# Patient Record
Sex: FEMALE | Race: WHITE | Employment: UNEMPLOYED | URBAN - METROPOLITAN AREA
[De-identification: names, ages, dates, MRNs, and addresses within clinical notes are randomized per-mention and may not be internally consistent; named-entity substitution may affect disease eponyms.]

---

## 2019-03-21 ENCOUNTER — OFFICE VISIT (OUTPATIENT)
Dept: PEDIATRICS | Facility: CLINIC | Age: 39
End: 2019-03-21
Payer: COMMERCIAL

## 2019-03-21 VITALS
SYSTOLIC BLOOD PRESSURE: 106 MMHG | HEIGHT: 68 IN | HEART RATE: 100 BPM | WEIGHT: 150.7 LBS | OXYGEN SATURATION: 99 % | DIASTOLIC BLOOD PRESSURE: 66 MMHG | BODY MASS INDEX: 22.84 KG/M2 | TEMPERATURE: 97.8 F

## 2019-03-21 DIAGNOSIS — M19.079 ARTHRITIS OF ANKLE: Primary | ICD-10-CM

## 2019-03-21 LAB
BASOPHILS # BLD AUTO: 0 10E9/L (ref 0–0.2)
BASOPHILS NFR BLD AUTO: 0.5 %
CRP SERPL-MCNC: 61 MG/L (ref 0–8)
DIFFERENTIAL METHOD BLD: ABNORMAL
EOSINOPHIL # BLD AUTO: 0.1 10E9/L (ref 0–0.7)
EOSINOPHIL NFR BLD AUTO: 0.8 %
ERYTHROCYTE [DISTWIDTH] IN BLOOD BY AUTOMATED COUNT: 12.4 % (ref 10–15)
ERYTHROCYTE [SEDIMENTATION RATE] IN BLOOD BY WESTERGREN METHOD: 45 MM/H (ref 0–20)
HCT VFR BLD AUTO: 35 % (ref 35–47)
HGB BLD-MCNC: 11.4 G/DL (ref 11.7–15.7)
LYMPHOCYTES # BLD AUTO: 1 10E9/L (ref 0.8–5.3)
LYMPHOCYTES NFR BLD AUTO: 12.1 %
MCH RBC QN AUTO: 28.3 PG (ref 26.5–33)
MCHC RBC AUTO-ENTMCNC: 32.6 G/DL (ref 31.5–36.5)
MCV RBC AUTO: 87 FL (ref 78–100)
MONOCYTES # BLD AUTO: 0.6 10E9/L (ref 0–1.3)
MONOCYTES NFR BLD AUTO: 7.6 %
NEUTROPHILS # BLD AUTO: 6.5 10E9/L (ref 1.6–8.3)
NEUTROPHILS NFR BLD AUTO: 79 %
PLATELET # BLD AUTO: 320 10E9/L (ref 150–450)
RBC # BLD AUTO: 4.03 10E12/L (ref 3.8–5.2)
WBC # BLD AUTO: 8.2 10E9/L (ref 4–11)

## 2019-03-21 PROCEDURE — 86140 C-REACTIVE PROTEIN: CPT | Performed by: FAMILY MEDICINE

## 2019-03-21 PROCEDURE — 85652 RBC SED RATE AUTOMATED: CPT | Performed by: FAMILY MEDICINE

## 2019-03-21 PROCEDURE — 86038 ANTINUCLEAR ANTIBODIES: CPT | Performed by: FAMILY MEDICINE

## 2019-03-21 PROCEDURE — 85025 COMPLETE CBC W/AUTO DIFF WBC: CPT | Performed by: FAMILY MEDICINE

## 2019-03-21 PROCEDURE — 99213 OFFICE O/P EST LOW 20 MIN: CPT | Performed by: FAMILY MEDICINE

## 2019-03-21 PROCEDURE — 36415 COLL VENOUS BLD VENIPUNCTURE: CPT | Performed by: FAMILY MEDICINE

## 2019-03-21 PROCEDURE — 86431 RHEUMATOID FACTOR QUANT: CPT | Performed by: FAMILY MEDICINE

## 2019-03-21 RX ORDER — NORGESTIMATE AND ETHINYL ESTRADIOL 0.25-0.035
1 KIT ORAL DAILY
COMMUNITY
End: 2019-06-04

## 2019-03-21 RX ORDER — PREDNISONE 10 MG/1
TABLET ORAL
Qty: 18 TABLET | Refills: 1 | Status: SHIPPED | OUTPATIENT
Start: 2019-03-21 | End: 2019-04-01

## 2019-03-21 RX ORDER — FUROSEMIDE 20 MG
TABLET ORAL
Qty: 15 TABLET | Refills: 1 | Status: SHIPPED | OUTPATIENT
Start: 2019-03-21 | End: 2019-06-04

## 2019-03-21 ASSESSMENT — MIFFLIN-ST. JEOR: SCORE: 1409.13

## 2019-03-21 NOTE — PROGRESS NOTES
"CHIEF COMPLAINT    Ankle pain, swelling following infections      HISTORY    Antonette has developed bilateral ankle pain and swelling onset March 9, SX seem to be worsening. Tend to be worse in AM. Location ankles and Achilles. Slight red / purple discoloration in the area noted. SX not helped by Ibuprofen.    She had Influenza A Feb 10-20. Then DX with pneumonia Feb 26 treated with MICHAEL David.     No past h/o joint problems. Healthy.      There is no problem list on file for this patient.    Med - BCP.      REVIEW OF SYSTEMS    No fever  No SOB  No CP  No nausea  No rash      No past medical history on file.      EXAM  /66 (BP Location: Right arm, Patient Position: Chair, Cuff Size: Adult Regular)   Pulse 100   Temp 97.8  F (36.6  C) (Oral)   Ht 1.715 m (5' 7.5\")   Wt 68.4 kg (150 lb 11.2 oz)   SpO2 99%   BMI 23.25 kg/m      Well appearing woman  Head, Neck unremarkable  Chest non labored breathing  Abd non distended  Hands, wrists - no sweling or tenderness  Ankles: swelling moderate, warmth R > L, Achilles tenderness, tenderness with dorsiflexion.      (M19.079) Arthritis of ankle  (primary encounter diagnosis)  Comment:   Bilateral. Undetermined cause.  Consider post infection.  Plan: CBC with platelets and differential, ESR:         Erythrocyte sedimentation rate, CRP,         inflammation, Anti Nuclear Darby IgG by IFA with         Reflex, Rheumatoid factor, RHEUMATOLOGY         REFERRAL, predniSONE (DELTASONE) 10 MG tablet,         furosemide (LASIX) 20 MG tablet                  "

## 2019-03-22 LAB
ANA SER QL IF: NEGATIVE
RHEUMATOID FACT SER NEPH-ACNC: <20 IU/ML (ref 0–20)

## 2019-04-01 DIAGNOSIS — M19.079 ARTHRITIS OF ANKLE: ICD-10-CM

## 2019-04-01 RX ORDER — PREDNISONE 10 MG/1
TABLET ORAL
Qty: 18 TABLET | Refills: 1 | Status: SHIPPED | OUTPATIENT
Start: 2019-04-01 | End: 2019-06-04

## 2019-06-04 ENCOUNTER — OFFICE VISIT (OUTPATIENT)
Dept: PEDIATRICS | Facility: CLINIC | Age: 39
End: 2019-06-04
Payer: COMMERCIAL

## 2019-06-04 VITALS
BODY MASS INDEX: 23.57 KG/M2 | DIASTOLIC BLOOD PRESSURE: 64 MMHG | SYSTOLIC BLOOD PRESSURE: 106 MMHG | HEIGHT: 68 IN | TEMPERATURE: 98.8 F | HEART RATE: 80 BPM | OXYGEN SATURATION: 100 % | WEIGHT: 155.5 LBS

## 2019-06-04 DIAGNOSIS — Z01.419 WELL WOMAN EXAM WITH ROUTINE GYNECOLOGICAL EXAM: ICD-10-CM

## 2019-06-04 DIAGNOSIS — Z30.41 ENCOUNTER FOR SURVEILLANCE OF CONTRACEPTIVE PILLS: Primary | ICD-10-CM

## 2019-06-04 DIAGNOSIS — F32.0 MILD MAJOR DEPRESSION (H): ICD-10-CM

## 2019-06-04 PROCEDURE — 99385 PREV VISIT NEW AGE 18-39: CPT | Performed by: INTERNAL MEDICINE

## 2019-06-04 PROCEDURE — 87624 HPV HI-RISK TYP POOLED RSLT: CPT | Performed by: INTERNAL MEDICINE

## 2019-06-04 PROCEDURE — G0145 SCR C/V CYTO,THINLAYER,RESCR: HCPCS | Performed by: INTERNAL MEDICINE

## 2019-06-04 RX ORDER — BUPROPION HYDROCHLORIDE 150 MG/1
150 TABLET ORAL EVERY MORNING
COMMUNITY
End: 2019-06-04

## 2019-06-04 RX ORDER — BUPROPION HYDROCHLORIDE 150 MG/1
150 TABLET ORAL EVERY MORNING
Qty: 90 TABLET | Refills: 3 | Status: SHIPPED | OUTPATIENT
Start: 2019-06-04

## 2019-06-04 RX ORDER — NORGESTIMATE AND ETHINYL ESTRADIOL 0.25-0.035
1 KIT ORAL DAILY
Qty: 84 TABLET | Refills: 11 | Status: SHIPPED | OUTPATIENT
Start: 2019-06-04

## 2019-06-04 SDOH — HEALTH STABILITY: MENTAL HEALTH: HOW MANY STANDARD DRINKS CONTAINING ALCOHOL DO YOU HAVE ON A TYPICAL DAY?: 1 OR 2

## 2019-06-04 ASSESSMENT — ENCOUNTER SYMPTOMS
BREAST MASS: 0
SORE THROAT: 0
FEVER: 0
PALPITATIONS: 0
SHORTNESS OF BREATH: 0
EYE PAIN: 0
CONSTIPATION: 0
FREQUENCY: 0
HEMATURIA: 0
MYALGIAS: 0
HEARTBURN: 0
DYSURIA: 0
COUGH: 0
ARTHRALGIAS: 0
NERVOUS/ANXIOUS: 0
HEMATOCHEZIA: 0
PARESTHESIAS: 0
ABDOMINAL PAIN: 0
DIZZINESS: 0
WEAKNESS: 0
NAUSEA: 0
CHILLS: 0
JOINT SWELLING: 0
DIARRHEA: 0
HEADACHES: 0

## 2019-06-04 ASSESSMENT — MIFFLIN-ST. JEOR: SCORE: 1438.84

## 2019-06-04 NOTE — PROGRESS NOTES
SUBJECTIVE:   CC: Antonette Ramos is an 38 year old woman who presents for preventive health visit.     Healthy Habits:     Getting at least 3 servings of Calcium per day:  Yes    Bi-annual eye exam:  Yes    Dental care twice a year:  Yes    Sleep apnea or symptoms of sleep apnea:  None    Diet:  Regular (no restrictions)    Frequency of exercise:  4-5 days/week    Duration of exercise:  15-30 minutes    Taking medications regularly:  Yes    Medication side effects:  None    PHQ-2 Total Score: 0    Additional concerns today:  No    Establishing care.    On Wellbutrin for mild depression symptoms; initially started as post-partum depression and when she tried going off this was more cranky with her children so went back on this.    Currently on OCP for birth control.  does not want to get a vasectomy, she is considering tubal ligation.      Today's PHQ-2 Score:   PHQ-2 ( 1999 [employer]) 6/4/2019   Q1: Little interest or pleasure in doing things 0   Q2: Feeling down, depressed or hopeless 0   PHQ-2 Score 0   Q1: Little interest or pleasure in doing things Not at all   Q2: Feeling down, depressed or hopeless Not at all   PHQ-2 Score 0       Abuse: Current or Past(Physical, Sexual or Emotional)- No  Do you feel safe in your environment? Yes    Social History     Tobacco Use     Smoking status: Never Smoker     Smokeless tobacco: Never Used   Substance Use Topics     Alcohol use: Not on file         Alcohol Use 6/4/2019   Prescreen: >3 drinks/day or >7 drinks/week? No       Reviewed orders with patient.  Reviewed health maintenance and updated orders accordingly - Yes  There is no problem list on file for this patient.    Past Surgical History:   Procedure Laterality Date     ARTHROSCOPY KNEE WITH MENISCECTOMY Right        Social History     Tobacco Use     Smoking status: Never Smoker     Smokeless tobacco: Never Used   Substance Use Topics     Alcohol use: Yes     Drinks per session: 1 or 2     History  "reviewed. No pertinent family history.        Mammo discussed, not appropriate for or declined by this patient.    Pertinent mammograms are reviewed under the imaging tab.  History of abnormal Pap smear: NO - age 30-65 PAP every 5 years with negative HPV co-testing recommended     Reviewed and updated as needed this visit by clinical staff  Tobacco  Allergies  Meds  Med Hx  Surg Hx  Fam Hx  Soc Hx        Reviewed and updated as needed this visit by Provider  Tobacco  Med Hx  Surg Hx  Fam Hx  Soc Hx           Review of Systems   Constitutional: Negative for chills and fever.   HENT: Negative for congestion, ear pain, hearing loss and sore throat.    Eyes: Negative for pain and visual disturbance.   Respiratory: Negative for cough and shortness of breath.    Cardiovascular: Negative for chest pain, palpitations and peripheral edema.   Gastrointestinal: Negative for abdominal pain, constipation, diarrhea, heartburn, hematochezia and nausea.   Breasts:  Negative for tenderness, breast mass and discharge.   Genitourinary: Negative for dysuria, frequency, genital sores, hematuria, pelvic pain, urgency, vaginal bleeding and vaginal discharge.   Musculoskeletal: Negative for arthralgias, joint swelling and myalgias.   Skin: Negative for rash.   Neurological: Negative for dizziness, weakness, headaches and paresthesias.   Psychiatric/Behavioral: Negative for mood changes. The patient is not nervous/anxious.         OBJECTIVE:   /64 (BP Location: Right arm, Patient Position: Sitting, Cuff Size: Adult Regular)   Pulse 80   Temp 98.8  F (37.1  C) (Oral)   Ht 1.727 m (5' 8\")   Wt 70.5 kg (155 lb 8 oz)   SpO2 100%   BMI 23.64 kg/m    Physical Exam  GENERAL: healthy, alert and no distress  EYES: Eyes grossly normal to inspection, PERRL and conjunctivae and sclerae normal  HENT: ear canals and TM's normal, nose and mouth without ulcers or lesions  NECK: no adenopathy, no asymmetry, masses, or scars and thyroid " "normal to palpation  RESP: lungs clear to auscultation - no rales, rhonchi or wheezes  BREAST: normal without masses, tenderness or nipple discharge and no palpable axillary masses or adenopathy  CV: regular rate and rhythm, normal S1 S2, no S3 or S4, no murmur, click or rub, no peripheral edema and peripheral pulses strong  ABDOMEN: soft, nontender, no hepatosplenomegaly, no masses and bowel sounds normal   (female): normal female external genitalia, normal urethral meatus, vaginal mucosa pink, moist, well rugated, and normal cervix without masses or discharge  MS: no gross musculoskeletal defects noted, no edema  SKIN: no suspicious lesions or rashes  NEURO: Normal strength and tone, mentation intact and speech normal  PSYCH: mentation appears normal, affect normal/bright        ASSESSMENT/PLAN:   1. Well woman exam with routine gynecological exam  Counseling as below.   - Pap imaged thin layer screen with HPV - recommended age 30 - 65 years (select HPV order below)    2. Encounter for surveillance of contraceptive pills  Reviewed medication risks, benefits. Discussed that she may want to consider IUD, she will schedule appointment if she decides to go this route vs permanent sterilization.   - norgestimate-ethinyl estradiol (ESTARYLLA) 0.25-35 MG-MCG tablet; Take 1 tablet by mouth daily  Dispense: 84 tablet; Refill: 11    3. Mild major depression (H)  Well controlled on current regimen.   - buPROPion (WELLBUTRIN XL) 150 MG 24 hr tablet; Take 1 tablet (150 mg) by mouth every morning  Dispense: 90 tablet; Refill: 3    COUNSELING:  Reviewed preventive health counseling, as reflected in patient instructions       Regular exercise       Healthy diet/nutrition       Vision screening       Contraception       Family planning       Safe sex practices/STD prevention    Estimated body mass index is 23.25 kg/m  as calculated from the following:    Height as of 3/21/19: 1.715 m (5' 7.5\").    Weight as of 3/21/19: 68.4 kg " (150 lb 11.2 oz).         reports that she has never smoked. She has never used smokeless tobacco.      Counseling Resources:  ATP IV Guidelines  Pooled Cohorts Equation Calculator  Breast Cancer Risk Calculator  FRAX Risk Assessment  ICSI Preventive Guidelines  Dietary Guidelines for Americans, 2010  USDA's MyPlate  ASA Prophylaxis  Lung CA Screening    Lindsey Johnson MD  [facility]

## 2019-06-04 NOTE — PATIENT INSTRUCTIONS
Bupropion and birth control refilled.     Schedule an appointment with Gloria James to get an IUD placed if you want to go that route.    Preventive Health Recommendations  Female Ages 26 - 39  Yearly exam:   See your health care provider every year in order to    Review health changes.     Discuss preventive care.      Review your medicines if you your doctor has prescribed any.    Until age 30: Get a Pap test every three years (more often if you have had an abnormal result).    After age 30: Talk to your doctor about whether you should have a Pap test every 3 years or have a Pap test with HPV screening every 5 years.   You do not need a Pap test if your uterus was removed (hysterectomy) and you have not had cancer.  You should be tested each year for STDs (sexually transmitted diseases), if you're at risk.   Talk to your provider about how often to have your cholesterol checked.  If you are at risk for diabetes, you should have a diabetes test (fasting glucose).  Shots: Get a flu shot each year. Get a tetanus shot every 10 years.   Nutrition:     Eat at least 5 servings of fruits and vegetables each day.    Eat whole-grain bread, whole-wheat pasta and brown rice instead of white grains and rice.    Get adequate Calcium and Vitamin D.     Lifestyle    Exercise at least 150 minutes a week (30 minutes a day, 5 days of the week). This will help you control your weight and prevent disease.    Limit alcohol to one drink per day.    No smoking.     Wear sunscreen to prevent skin cancer.    See your dentist every six months for an exam and cleaning.

## 2019-06-07 LAB
COPATH REPORT: NORMAL
PAP: NORMAL

## 2019-06-10 LAB
FINAL DIAGNOSIS: NORMAL
HPV HR 12 DNA CVX QL NAA+PROBE: NEGATIVE
HPV16 DNA SPEC QL NAA+PROBE: NEGATIVE
HPV18 DNA SPEC QL NAA+PROBE: NEGATIVE
SPECIMEN DESCRIPTION: NORMAL
SPECIMEN SOURCE CVX/VAG CYTO: NORMAL

## 2019-06-11 ENCOUNTER — MYC MEDICAL ADVICE (OUTPATIENT)
Dept: PEDIATRICS | Facility: CLINIC | Age: 39
End: 2019-06-11

## 2019-06-11 NOTE — TELEPHONE ENCOUNTER
Dr. uHrley-Ok to call patient to let her know her pap smear/HPV was normal and release the results to her, advise.

## 2019-06-24 ENCOUNTER — MYC MEDICAL ADVICE (OUTPATIENT)
Dept: PEDIATRICS | Facility: CLINIC | Age: 39
End: 2019-06-24

## 2019-10-08 ENCOUNTER — ALLIED HEALTH/NURSE VISIT (OUTPATIENT)
Dept: NURSING | Facility: CLINIC | Age: 39
End: 2019-10-08
Payer: COMMERCIAL

## 2019-10-08 DIAGNOSIS — Z23 NEED FOR PROPHYLACTIC VACCINATION AND INOCULATION AGAINST INFLUENZA: Primary | ICD-10-CM

## 2019-10-08 PROCEDURE — 90471 IMMUNIZATION ADMIN: CPT

## 2019-10-08 PROCEDURE — 90686 IIV4 VACC NO PRSV 0.5 ML IM: CPT

## 2019-10-08 NOTE — PROGRESS NOTES
Prior to immunization administration, verified patients identity using patient s name and date of birth. Please see Immunization Activity for additional information.     Screening Questionnaire for Adult Immunization    Are you sick today?   No   Do you have allergies to medications, food, a vaccine component or latex?   No   Have you ever had a serious reaction after receiving a vaccination?   No   Do you have a long-term health problem with heart disease, lung disease, asthma, kidney disease, metabolic disease (e.g. diabetes), anemia, or other blood disorder?   No   Do you have cancer, leukemia, HIV/AIDS, or any other immune system problem?   No   In the past 3 months, have you taken medications that affect  your immune system, such as prednisone, other steroids, or anticancer drugs; drugs for the treatment of rheumatoid arthritis, Crohn s disease, or psoriasis; or have you had radiation treatments?   No   Have you had a seizure, or a brain or other nervous system problem?   No   During the past year, have you received a transfusion of blood or blood     products, or been given immune (gamma) globulin or antiviral drug?   No   For women: Are you pregnant or is there a chance you could become        pregnant during the next month?   No   Have you received any vaccinations in the past 4 weeks?   No     Immunization questionnaire answers were all negative.        Per orders of Dr. Hurley, injection of Flu given by Yarelis Munoz CMA. Patient instructed to remain in clinic for 15 minutes afterwards, and to report any adverse reaction to me immediately.       Screening performed by Yarelis Munoz CMA on 10/8/2019 at 11:50 AM.

## 2021-01-03 ENCOUNTER — HEALTH MAINTENANCE LETTER (OUTPATIENT)
Age: 41
End: 2021-01-03

## 2021-10-10 ENCOUNTER — HEALTH MAINTENANCE LETTER (OUTPATIENT)
Age: 41
End: 2021-10-10

## 2022-01-29 ENCOUNTER — HEALTH MAINTENANCE LETTER (OUTPATIENT)
Age: 42
End: 2022-01-29

## 2022-09-18 ENCOUNTER — HEALTH MAINTENANCE LETTER (OUTPATIENT)
Age: 42
End: 2022-09-18

## 2024-02-25 ENCOUNTER — HEALTH MAINTENANCE LETTER (OUTPATIENT)
Age: 44
End: 2024-02-25